# Patient Record
Sex: MALE | Race: WHITE
[De-identification: names, ages, dates, MRNs, and addresses within clinical notes are randomized per-mention and may not be internally consistent; named-entity substitution may affect disease eponyms.]

---

## 2018-06-10 ENCOUNTER — HOSPITAL ENCOUNTER (EMERGENCY)
Dept: HOSPITAL 56 - MW.ED | Age: 28
Discharge: OTHER INSTITUTION | End: 2018-06-10
Payer: SELF-PAY

## 2018-06-10 VITALS — SYSTOLIC BLOOD PRESSURE: 139 MMHG | DIASTOLIC BLOOD PRESSURE: 83 MMHG

## 2018-06-10 DIAGNOSIS — T39.012A: ICD-10-CM

## 2018-06-10 DIAGNOSIS — R51: ICD-10-CM

## 2018-06-10 DIAGNOSIS — T39.1X2A: Primary | ICD-10-CM

## 2018-06-10 LAB
APAP SERPL-MCNC: 21.8 UG/ML
CHLORIDE SERPL-SCNC: 103 MMOL/L (ref 98–107)
SODIUM SERPL-SCNC: 141 MMOL/L (ref 136–148)

## 2018-06-10 PROCEDURE — 85025 COMPLETE CBC W/AUTO DIFF WBC: CPT

## 2018-06-10 PROCEDURE — 81001 URINALYSIS AUTO W/SCOPE: CPT

## 2018-06-10 PROCEDURE — 36415 COLL VENOUS BLD VENIPUNCTURE: CPT

## 2018-06-10 PROCEDURE — 85610 PROTHROMBIN TIME: CPT

## 2018-06-10 PROCEDURE — 80053 COMPREHEN METABOLIC PANEL: CPT

## 2018-06-10 PROCEDURE — 84443 ASSAY THYROID STIM HORMONE: CPT

## 2018-06-10 PROCEDURE — 83735 ASSAY OF MAGNESIUM: CPT

## 2018-06-10 PROCEDURE — 93005 ELECTROCARDIOGRAM TRACING: CPT

## 2018-06-10 PROCEDURE — 82375 ASSAY CARBOXYHB QUANT: CPT

## 2018-06-10 PROCEDURE — 80305 DRUG TEST PRSMV DIR OPT OBS: CPT

## 2018-06-10 PROCEDURE — 99285 EMERGENCY DEPT VISIT HI MDM: CPT

## 2018-06-10 PROCEDURE — G0480 DRUG TEST DEF 1-7 CLASSES: HCPCS

## 2018-06-10 PROCEDURE — 96374 THER/PROPH/DIAG INJ IV PUSH: CPT

## 2018-06-10 NOTE — EDM.PDOC
ED HPI GENERAL MEDICAL PROBLEM





- General


Chief Complaint: Behavioral/Psych


Stated Complaint: MEDICATION OD


Time Seen by Provider: 06/10/18 07:16


Source of Information: Reports: Patient





- History of Present Illness


INITIAL COMMENTS - FREE TEXT/NARRATIVE: 





HISTORY AND PHYSICAL:


History of present illness:


[Patient presents with suicide attempt last night





He had locked himself in the garage between 11 PM and 1 AM with a vehicle 

running, 9 hours later carboxyhemoglobin level is 13, at approximately 1 AM he 

took a mix of aspirin and Tylenol approximately 30 tablets levels are nontoxic 

at this time 9 hours later





Patient is in no distress, denies history of depression or chronic medications 

illness or disease denies smoking alcohol or illicit drug use





No prior suicidal ideation or attempt





No fever nausea vomiting chills sweats no chest pain shortness breath headache 

dizziness palpitation no bowel or urine symptoms]


Review of systems: 


As per history of present illness and below otherwise all systems reviewed and 

negative.


Past medical history: 


As per history of present illness and as reviewed below otherwise 

noncontributory.


Surgical history: 


As per history of present illness and as reviewed below otherwise 

noncontributory.


Social history: 


No reported history of drug or alcohol abuse.


Family history: 


As per history of present illness and as reviewed below otherwise 

noncontributory.


Physical exam:


HEENT: Atraumatic, normocephalic, pupils reactive, negative for conjunctival 

pallor or scleral icterus, mucous membranes moist, throat clear, neck supple, 

nontender, trachea midline.


Lungs: Clear to auscultation, breath sounds equal bilaterally, chest nontender.


Heart: S1S2, regular, negative for clicks, rubs, or JVD.


Abdomen: Soft, nondistended, nontender. Negative for masses or 

hepatosplenomegaly. Negative for costovertebral tenderness.


Pelvis: Stable nontender.


Genitourinary: Deferred.


Rectal: Deferred.


Extremities: Atraumatic, negative for cords or calf pain. Neurovascular 

unremarkable.


Neuro: Awake, alert, oriented. Cranial nerves II through XII unremarkable. 

Cerebellum unremarkable. Motor and sensory unremarkable throughout. Exam 

nonfocal.


Diagnostics:


[CBC CMP UA drug screen aspirin Tylenol and alcohol levels


EKG


]


Therapeutics:


[Oxygen]


Impression: 


[Suicide attempt]


Definitive disposition and diagnosis as appropriate pending reevaluation and 

review of above.


  ** Headache


Pain Score (Numeric/FACES): 7





- Related Data


 Allergies











Allergy/AdvReac Type Severity Reaction Status Date / Time


 


No Known Allergies Allergy   Verified 06/10/18 07:09











Home Meds: 


 Home Meds





. [No Known Home Meds]  02/12/16 [History]











Past Medical History





- Past Health History


Medical/Surgical History: Denies Medical/Surgical History





Social & Family History





- Family History


Family Medical History: Noncontributory





ED ROS GENERAL





- Review of Systems


Review Of Systems: See Below





ED EXAM, GENERAL





- Physical Exam


Exam: See Below





Course





- Vital Signs


Last Recorded V/S: 


 Last Vital Signs











Temp  98.6 F   06/10/18 07:06


 


Pulse  99   06/10/18 07:06


 


Resp  18   06/10/18 07:06


 


BP  139/83   06/10/18 07:06


 


Pulse Ox  96   06/10/18 07:06














- Orders/Labs/Meds


Orders: 


 Active Orders 24 hr











 Category Date Time Status


 


 EKG Documentation Completion [RC] STAT Care  06/10/18 07:05 Active


 


 DRUG SCREEN, URINE [URCHEM] Stat Lab  06/10/18 07:05 Ordered


 


 INR,PT,PROTHROMBIN TIME [COAG] Stat Lab  06/10/18 08:29 Ordered


 


 UA W/MICROSCOPIC [URIN] Stat Lab  06/10/18 07:05 Ordered











Labs: 


 Laboratory Tests











  06/10/18 06/10/18 06/10/18 Range/Units





  07:16 07:16 07:16 


 


WBC  12.43 H    (4.0-11.0)  K/uL


 


RBC  5.45    (4.50-5.90)  M/uL


 


Hgb  16.8    (13.0-17.0)  g/dL


 


Hct  45.5    (38.0-50.0)  %


 


MCV  83.5    (80.0-98.0)  fL


 


MCH  30.8    (27.0-32.0)  pg


 


MCHC  36.9    (31.0-37.0)  g/dL


 


RDW Std Deviation  39.4    (28.0-62.0)  fl


 


RDW Coeff of Spencer  13    (11.0-15.0)  %


 


Plt Count  252    (150-400)  K/uL


 


MPV  10.40    (7.40-12.00)  fL


 


Neut % (Auto)  82.5 H    (48.0-80.0)  %


 


Lymph % (Auto)  11.1 L    (16.0-40.0)  %


 


Mono % (Auto)  6.2    (0.0-15.0)  %


 


Eos % (Auto)  0.0    (0.0-7.0)  %


 


Baso % (Auto)  0.2    (0.0-1.5)  %


 


Neut # (Auto)  10.3 H    (1.4-5.7)  K/uL


 


Lymph # (Auto)  1.4    (0.6-2.4)  K/uL


 


Mono # (Auto)  0.8    (0.0-0.8)  K/uL


 


Eos # (Auto)  0.0    (0.0-0.7)  K/uL


 


Baso # (Auto)  0.0    (0.0-0.1)  K/uL


 


Nucleated RBC %  0.0    /100WBC


 


Nucleated RBCs #  0    K/uL


 


ABG Carboxyhemoglobin   13.1   (0-15)  %


 


Sodium    141  (136-148)  mmol/L


 


Potassium    3.7  (3.5-5.1)  mmol/L


 


Chloride    103  ()  mmol/L


 


Carbon Dioxide    22.5  (21.0-32.0)  mmol/L


 


BUN    13  (7.0-18.0)  mg/dL


 


Creatinine    1.4 H  (0.8-1.3)  mg/dL


 


Est Cr Clr Drug Dosing    81.84  mL/min


 


Estimated GFR (MDRD)    > 60.0  ml/min


 


Glucose    147 H  ()  mg/dL


 


Calcium    9.4  (8.5-10.1)  mg/dL


 


Magnesium    1.9  (1.8-2.4)  mg/dL


 


Total Bilirubin    0.4  (0.2-1.0)  mg/dL


 


AST    22  (15-37)  IU/L


 


ALT    24  (14-63)  IU/L


 


Alkaline Phosphatase    65  ()  U/L


 


Total Protein    8.2  (6.4-8.2)  g/dL


 


Albumin    4.5  (3.4-5.0)  g/dL


 


Globulin    3.7 H  (2.0-3.5)  g/dL


 


Albumin/Globulin Ratio    1.2 L  (1.3-2.8)  


 


TSH 3rd Generation    1.41  (0.36-3.74)  uIU/mL


 


Salicylates    24.9 H  (0-20)  mg/dL


 


Acetaminophen    21.8  ug/mL


 


Ethyl Alcohol    < 3.0  mg/dL











Meds: 


Medications














Discontinued Medications














Generic Name Dose Route Start Last Admin





  Trade Name Ameena  PRN Reason Stop Dose Admin


 


Ondansetron HCl  8 mg  06/10/18 07:12  06/10/18 07:21





  Zofran  IVPUSH  06/10/18 07:13  8 mg





  ONETIME ONE   Administration





     





     





     





     














Departure





- Departure


Time of Disposition: 08:45


Disposition: DC/Tfer to Other 70


Condition: Fair


Clinical Impression: 


 Suicide attempt








- Discharge Information


Referrals: 


Dylan Alexander MD [Primary Care Provider] - 


Forms:  ED Department Discharge





- My Orders


Last 24 Hours: 


My Active Orders





06/10/18 08:29


INR,PT,PROTHROMBIN TIME [COAG] Stat 














- Assessment/Plan


Last 24 Hours: 


My Active Orders





06/10/18 08:29


INR,PT,PROTHROMBIN TIME [COAG] Stat

## 2019-08-17 ENCOUNTER — HOSPITAL ENCOUNTER (EMERGENCY)
Dept: HOSPITAL 56 - MW.ED | Age: 29
Discharge: NURSING, REHAB OR LONG-TERM CARE | End: 2019-08-17
Payer: SELF-PAY

## 2019-08-17 VITALS — DIASTOLIC BLOOD PRESSURE: 85 MMHG | SYSTOLIC BLOOD PRESSURE: 143 MMHG

## 2019-08-17 DIAGNOSIS — T43.212A: ICD-10-CM

## 2019-08-17 DIAGNOSIS — Z79.899: ICD-10-CM

## 2019-08-17 DIAGNOSIS — T40.4X2A: Primary | ICD-10-CM

## 2019-08-17 DIAGNOSIS — F32.9: ICD-10-CM

## 2019-08-17 LAB
APAP SERPL-MCNC: <2 UG/ML
CHLORIDE SERPL-SCNC: 108 MMOL/L (ref 98–107)
SODIUM SERPL-SCNC: 143 MMOL/L (ref 136–148)

## 2019-08-17 PROCEDURE — G0480 DRUG TEST DEF 1-7 CLASSES: HCPCS

## 2019-08-17 PROCEDURE — 80305 DRUG TEST PRSMV DIR OPT OBS: CPT

## 2019-08-17 PROCEDURE — 80053 COMPREHEN METABOLIC PANEL: CPT

## 2019-08-17 PROCEDURE — 36415 COLL VENOUS BLD VENIPUNCTURE: CPT

## 2019-08-17 PROCEDURE — 99285 EMERGENCY DEPT VISIT HI MDM: CPT

## 2019-08-17 PROCEDURE — 81003 URINALYSIS AUTO W/O SCOPE: CPT

## 2019-08-17 PROCEDURE — 93005 ELECTROCARDIOGRAM TRACING: CPT

## 2019-08-17 PROCEDURE — 96374 THER/PROPH/DIAG INJ IV PUSH: CPT

## 2019-08-17 PROCEDURE — 85025 COMPLETE CBC W/AUTO DIFF WBC: CPT

## 2019-08-17 PROCEDURE — 96361 HYDRATE IV INFUSION ADD-ON: CPT

## 2019-08-17 PROCEDURE — 84443 ASSAY THYROID STIM HORMONE: CPT

## 2019-08-17 PROCEDURE — 71045 X-RAY EXAM CHEST 1 VIEW: CPT

## 2019-08-17 NOTE — EDM.PDOC
ED HPI GENERAL MEDICAL PROBLEM





- General


Chief Complaint: Drug or Alcohol Abuse


Stated Complaint: EMS ARRIVAL


Time Seen by Provider: 08/17/19 15:30





- History of Present Illness


INITIAL COMMENTS - FREE TEXT/NARRATIVE: 


HISTORY AND PHYSICAL:





History of present illness:


Patient 29-year-old male history of depression presents status post suicide 

attempt in the form of overdose he took an unknown amount of tramadol Cymbalta 

and used alcohol with this he had represented this to paramedics who arrived 

after they were called by the patient's roommate for this event.





Review of systems: 


As per history of present illness and below otherwise all systems reviewed and 

negative.





Past medical history: 


As per history of present illness and as reviewed below otherwise 

noncontributory.





Surgical history: 


As per history of present illness and as reviewed below otherwise 

noncontributory.





Social history: 


No reported history of drug or alcohol abuse.





Family history: 


As per history of present illness and as reviewed below otherwise 

noncontributory.





Physical exam:


HEENT: Atraumatic, normocephalic, pupils reactive, negative for conjunctival 

pallor or scleral icterus, mucous membranes moist, throat clear, neck supple, 

nontender, trachea midline.


Lungs: Clear to auscultation, breath sounds equal bilaterally, chest nontender.


Heart: S1S2, regular, negative for clicks, rubs, or JVD.


Abdomen: Soft, nondistended, nontender. Negative for masses or 

hepatosplenomegaly. Negative for costovertebral tenderness.


Pelvis: Stable nontender.


Genitourinary: Deferred.


Rectal: Deferred.


Extremities: Atraumatic, negative for cords or calf pain. Neurovascular 

unremarkable.


Neuro: Patient somnolent he does follow commands and move all extremities he is 

protecting his airway is limited grossly nonfocal exam





Diagnostics:


Psychiatric panel





Therapeutics:


IV O2 monitor





Impression: 


#1 depressive episode with suicide attempt #2 poly-overdose





Definitive disposition and diagnosis as appropriate pending reevaluation and 

review of above.








- Related Data


 Allergies











Allergy/AdvReac Type Severity Reaction Status Date / Time


 


No Known Allergies Allergy   Verified 08/17/19 15:36











Home Meds: 


 Home Meds





DULoxetine [Cymbalta] 30 mg PO DAILY 08/17/19 [History]











Past Medical History





- Past Health History


Medical/Surgical History: Denies Medical/Surgical History





Social & Family History





- Family History


Family Medical History: Noncontributory





- Caffeine Use


Caffeine Use: Reports: None





ED ROS GENERAL





- Review of Systems


Review Of Systems: ROS reveals no pertinent complaints other than HPI.





ED EXAM, GENERAL





- Physical Exam


Exam: See Below (See dictation)





Course





- Vital Signs


Last Recorded V/S: 


 Last Vital Signs











Temp  36.1 C   08/17/19 15:34


 


Pulse  91   08/17/19 15:34


 


Resp  20   08/17/19 15:34


 


BP  119/81   08/17/19 15:34


 


Pulse Ox  97   08/17/19 15:34














- Orders/Labs/Meds


Orders: 


 Active Orders 24 hr











 Category Date Time Status


 


 EKG Documentation Completion [RC] STAT Care  08/17/19 15:33 Active


 


 Chest 1V Frontal [CR] Stat Exams  08/17/19 15:33 Ordered


 


 Sodium Chloride 0.9% [Normal Saline] 1,000 ml Med  08/17/19 15:38 Active





 IV .Bolus   








 Medication Orders





Sodium Chloride (Normal Saline)  1,000 mls @ 999 mls/hr IV .Bolus ONE


   Stop: 08/17/19 16:38


   Last Admin: 08/17/19 16:05  Dose: 999 mls/hr








Labs: 


 Laboratory Tests











  08/17/19 08/17/19 08/17/19 Range/Units





  15:30 15:30 15:40 


 


WBC  5.47    (4.0-11.0)  K/uL


 


RBC  5.36    (4.50-5.90)  M/uL


 


Hgb  16.2    (13.0-17.0)  g/dL


 


Hct  45.8    (38.0-50.0)  %


 


MCV  85.4    (80.0-98.0)  fL


 


MCH  30.2    (27.0-32.0)  pg


 


MCHC  35.4    (31.0-37.0)  g/dL


 


RDW Std Deviation  40.4    (28.0-62.0)  fl


 


RDW Coeff of Spencer  13    (11.0-15.0)  %


 


Plt Count  225    (150-400)  K/uL


 


MPV  10.60    (7.40-12.00)  fL


 


Neut % (Auto)  51.8    (48.0-80.0)  %


 


Lymph % (Auto)  36.6    (16.0-40.0)  %


 


Mono % (Auto)  9.3    (0.0-15.0)  %


 


Eos % (Auto)  1.6    (0.0-7.0)  %


 


Baso % (Auto)  0.7    (0.0-1.5)  %


 


Neut # (Auto)  2.8    (1.4-5.7)  K/uL


 


Lymph # (Auto)  2.0    (0.6-2.4)  K/uL


 


Mono # (Auto)  0.5    (0.0-0.8)  K/uL


 


Eos # (Auto)  0.1    (0.0-0.7)  K/uL


 


Baso # (Auto)  0.0    (0.0-0.1)  K/uL


 


Nucleated RBC %  0.0    /100WBC


 


Nucleated RBCs #  0    K/uL


 


Sodium   143   (136-148)  mmol/L


 


Potassium   3.8   (3.5-5.1)  mmol/L


 


Chloride   108 H   ()  mmol/L


 


Carbon Dioxide   25.4   (21.0-32.0)  mmol/L


 


BUN   9   (7.0-18.0)  mg/dL


 


Creatinine   1.1   (0.8-1.3)  mg/dL


 


Est Cr Clr Drug Dosing   108.76   mL/min


 


Estimated GFR (MDRD)   > 60.0   ml/min


 


Glucose   93   ()  mg/dL


 


Calcium   8.7   (8.5-10.1)  mg/dL


 


Total Bilirubin   0.8   (0.2-1.0)  mg/dL


 


AST   22   (15-37)  IU/L


 


ALT   32   (14-63)  IU/L


 


Alkaline Phosphatase   63   ()  U/L


 


Total Protein   7.3   (6.4-8.2)  g/dL


 


Albumin   3.7   (3.4-5.0)  g/dL


 


Globulin   3.6   (2.6-4.0)  g/dL


 


Albumin/Globulin Ratio   1.0   (0.9-1.6)  


 


TSH 3rd Generation   0.81   (0.36-3.74)  uIU/mL


 


Urine Color    YELLOW  


 


Urine Appearance    CLEAR  


 


Urine pH    6.0  (5.0-8.0)  


 


Ur Specific Gravity    1.015  (1.001-1.035)  


 


Urine Protein    NEGATIVE  (NEGATIVE)  mg/dL


 


Urine Glucose (UA)    NEGATIVE  (NEGATIVE)  mg/dL


 


Urine Ketones    NEGATIVE  (NEGATIVE)  mg/dL


 


Urine Occult Blood    NEGATIVE  (NEGATIVE)  


 


Urine Nitrite    NEGATIVE  (NEGATIVE)  


 


Urine Bilirubin    NEGATIVE  (NEGATIVE)  


 


Urine Urobilinogen    0.2  (<2.0)  EU/dL


 


Ur Leukocyte Esterase    NEGATIVE  (NEGATIVE)  


 


Salicylates   <0.2   (0-20)  mg/dL


 


Urine Opiates Screen     (NEGATIVE)  


 


Ur Oxycodone Screen     (NEGATIVE)  


 


Urine Methadone Screen     (NEGATIVE)  


 


Acetaminophen   <2.0   ug/mL


 


Ur Barbiturates Screen     (NEGATIVE)  


 


Ur Phencyclidine Scrn     (NEGATIVE)  


 


Ur Amphetamine Screen     (NEGATIVE)  


 


U Methamphetamines Scrn     (NEGATIVE)  


 


U Benzodiazepines Scrn     (NEGATIVE)  


 


U Cocaine Metab Screen     (NEGATIVE)  


 


U Marijuana (THC) Screen     (NEGATIVE)  


 


Ethyl Alcohol   3   mg/dL














  08/17/19 Range/Units





  15:40 


 


WBC   (4.0-11.0)  K/uL


 


RBC   (4.50-5.90)  M/uL


 


Hgb   (13.0-17.0)  g/dL


 


Hct   (38.0-50.0)  %


 


MCV   (80.0-98.0)  fL


 


MCH   (27.0-32.0)  pg


 


MCHC   (31.0-37.0)  g/dL


 


RDW Std Deviation   (28.0-62.0)  fl


 


RDW Coeff of Spencer   (11.0-15.0)  %


 


Plt Count   (150-400)  K/uL


 


MPV   (7.40-12.00)  fL


 


Neut % (Auto)   (48.0-80.0)  %


 


Lymph % (Auto)   (16.0-40.0)  %


 


Mono % (Auto)   (0.0-15.0)  %


 


Eos % (Auto)   (0.0-7.0)  %


 


Baso % (Auto)   (0.0-1.5)  %


 


Neut # (Auto)   (1.4-5.7)  K/uL


 


Lymph # (Auto)   (0.6-2.4)  K/uL


 


Mono # (Auto)   (0.0-0.8)  K/uL


 


Eos # (Auto)   (0.0-0.7)  K/uL


 


Baso # (Auto)   (0.0-0.1)  K/uL


 


Nucleated RBC %   /100WBC


 


Nucleated RBCs #   K/uL


 


Sodium   (136-148)  mmol/L


 


Potassium   (3.5-5.1)  mmol/L


 


Chloride   ()  mmol/L


 


Carbon Dioxide   (21.0-32.0)  mmol/L


 


BUN   (7.0-18.0)  mg/dL


 


Creatinine   (0.8-1.3)  mg/dL


 


Est Cr Clr Drug Dosing   mL/min


 


Estimated GFR (MDRD)   ml/min


 


Glucose   ()  mg/dL


 


Calcium   (8.5-10.1)  mg/dL


 


Total Bilirubin   (0.2-1.0)  mg/dL


 


AST   (15-37)  IU/L


 


ALT   (14-63)  IU/L


 


Alkaline Phosphatase   ()  U/L


 


Total Protein   (6.4-8.2)  g/dL


 


Albumin   (3.4-5.0)  g/dL


 


Globulin   (2.6-4.0)  g/dL


 


Albumin/Globulin Ratio   (0.9-1.6)  


 


TSH 3rd Generation   (0.36-3.74)  uIU/mL


 


Urine Color   


 


Urine Appearance   


 


Urine pH   (5.0-8.0)  


 


Ur Specific Gravity   (1.001-1.035)  


 


Urine Protein   (NEGATIVE)  mg/dL


 


Urine Glucose (UA)   (NEGATIVE)  mg/dL


 


Urine Ketones   (NEGATIVE)  mg/dL


 


Urine Occult Blood   (NEGATIVE)  


 


Urine Nitrite   (NEGATIVE)  


 


Urine Bilirubin   (NEGATIVE)  


 


Urine Urobilinogen   (<2.0)  EU/dL


 


Ur Leukocyte Esterase   (NEGATIVE)  


 


Salicylates   (0-20)  mg/dL


 


Urine Opiates Screen  NEGATIVE  (NEGATIVE)  


 


Ur Oxycodone Screen  NEGATIVE  (NEGATIVE)  


 


Urine Methadone Screen  NEGATIVE  (NEGATIVE)  


 


Acetaminophen   ug/mL


 


Ur Barbiturates Screen  NEGATIVE  (NEGATIVE)  


 


Ur Phencyclidine Scrn  NEGATIVE  (NEGATIVE)  


 


Ur Amphetamine Screen  NEGATIVE  (NEGATIVE)  


 


U Methamphetamines Scrn  NEGATIVE  (NEGATIVE)  


 


U Benzodiazepines Scrn  NEGATIVE  (NEGATIVE)  


 


U Cocaine Metab Screen  NEGATIVE  (NEGATIVE)  


 


U Marijuana (THC) Screen  NEGATIVE  (NEGATIVE)  


 


Ethyl Alcohol   mg/dL











Meds: 


Medications











Generic Name Dose Route Start Last Admin





  Trade Name Freq  PRN Reason Stop Dose Admin


 


Sodium Chloride  1,000 mls @ 999 mls/hr  08/17/19 15:38  08/17/19 16:05





  Normal Saline  IV  08/17/19 16:38  999 mls/hr





  .Bolus ONE   Administration





     





     





     





     














Discontinued Medications














Generic Name Dose Route Start Last Admin





  Trade Name Freq  PRN Reason Stop Dose Admin


 


Ondansetron HCl  Confirm  08/17/19 15:35  08/17/19 15:55





  Zofran  Administered  08/17/19 15:36  Not Given





  Dose   





  4 mg   





  .ROUTE   





  .STK-MED ONE   





     





     





     





     


 


Ondansetron HCl  4 mg  08/17/19 15:38  08/17/19 16:05





  Zofran  IVPUSH  08/17/19 15:39  4 mg





  ONETIME ONE   Administration





     





     





     





     














Departure





- Departure


Time of Disposition: 16:17


Disposition: DC[facility] to [facility]


Condition: Good


Clinical Impression: 


 Overdose, Suicide attempt








- Discharge Information


Referrals: 


PCP,Unknown [Primary Care Provider] - 


Forms:  ED Department Discharge





- My Orders


Last 24 Hours: 


My Active Orders





08/17/19 15:33


EKG Documentation Completion [RC] STAT 


Chest 1V Frontal [CR] Stat 





08/17/19 15:38


Sodium Chloride 0.9% [Normal Saline] 1,000 ml IV .Bolus 














- Assessment/Plan


Last 24 Hours: 


My Active Orders





08/17/19 15:33


EKG Documentation Completion [RC] STAT 


Chest 1V Frontal [CR] Stat 





08/17/19 15:38


Sodium Chloride 0.9% [Normal Saline] 1,000 ml IV .Bolus

## 2019-08-17 NOTE — CR
Indication:



Overdose.



Technique:



A single AP portable view of the chest was obtained.



Comparison:



None



Findings:



Heart is normal in size. The lungs are clear. No infiltrate, pleural 

effusion, or pneumothorax is identified.



Impression:



No acute cardiopulmonary process.



Dictated by Rupal De MD @ Aug 17 2019  4:31PM



Signed by Dr. Rupal De @ Aug 17 2019  4:32PM

## 2019-09-07 ENCOUNTER — HOSPITAL ENCOUNTER (EMERGENCY)
Dept: HOSPITAL 56 - MW.ED | Age: 29
Discharge: HOME | End: 2019-09-07
Payer: COMMERCIAL

## 2019-09-07 VITALS — SYSTOLIC BLOOD PRESSURE: 132 MMHG | DIASTOLIC BLOOD PRESSURE: 76 MMHG

## 2019-09-07 DIAGNOSIS — Z79.899: ICD-10-CM

## 2019-09-07 DIAGNOSIS — R07.81: Primary | ICD-10-CM
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
## 2020-10-23 ENCOUNTER — HOSPITAL ENCOUNTER (EMERGENCY)
Dept: HOSPITAL 56 - MW.ED | Age: 30
Discharge: OTHER INSTITUTION | End: 2020-10-23
Payer: COMMERCIAL

## 2020-10-23 VITALS — DIASTOLIC BLOOD PRESSURE: 74 MMHG | SYSTOLIC BLOOD PRESSURE: 117 MMHG | HEART RATE: 73 BPM

## 2020-10-23 DIAGNOSIS — Z20.828: ICD-10-CM

## 2020-10-23 DIAGNOSIS — T43.012A: Primary | ICD-10-CM

## 2020-10-23 DIAGNOSIS — F41.9: ICD-10-CM

## 2020-10-23 DIAGNOSIS — Z79.899: ICD-10-CM

## 2020-10-23 DIAGNOSIS — T43.592A: ICD-10-CM

## 2020-10-23 LAB
APAP SERPL-MCNC: <2 UG/ML
BUN SERPL-MCNC: 12 MG/DL (ref 7–18)
CHLORIDE SERPL-SCNC: 103 MMOL/L (ref 98–107)
CO2 SERPL-SCNC: 24.6 MMOL/L (ref 21–32)
GLUCOSE SERPL-MCNC: 167 MG/DL (ref 74–106)
POTASSIUM SERPL-SCNC: 3.9 MMOL/L (ref 3.5–5.1)
SODIUM SERPL-SCNC: 140 MMOL/L (ref 136–148)

## 2020-10-23 PROCEDURE — 96374 THER/PROPH/DIAG INJ IV PUSH: CPT

## 2020-10-23 PROCEDURE — 87635 SARS-COV-2 COVID-19 AMP PRB: CPT

## 2020-10-23 PROCEDURE — 51702 INSERT TEMP BLADDER CATH: CPT

## 2020-10-23 PROCEDURE — 74018 RADEX ABDOMEN 1 VIEW: CPT

## 2020-10-23 PROCEDURE — 36415 COLL VENOUS BLD VENIPUNCTURE: CPT

## 2020-10-23 PROCEDURE — 80307 DRUG TEST PRSMV CHEM ANLYZR: CPT

## 2020-10-23 PROCEDURE — 84443 ASSAY THYROID STIM HORMONE: CPT

## 2020-10-23 PROCEDURE — 71045 X-RAY EXAM CHEST 1 VIEW: CPT

## 2020-10-23 PROCEDURE — 99292 CRITICAL CARE ADDL 30 MIN: CPT

## 2020-10-23 PROCEDURE — 80053 COMPREHEN METABOLIC PANEL: CPT

## 2020-10-23 PROCEDURE — U0002 COVID-19 LAB TEST NON-CDC: HCPCS

## 2020-10-23 PROCEDURE — 83735 ASSAY OF MAGNESIUM: CPT

## 2020-10-23 PROCEDURE — 93005 ELECTROCARDIOGRAM TRACING: CPT

## 2020-10-23 PROCEDURE — 99291 CRITICAL CARE FIRST HOUR: CPT

## 2020-10-23 PROCEDURE — 96376 TX/PRO/DX INJ SAME DRUG ADON: CPT

## 2020-10-23 PROCEDURE — 85025 COMPLETE CBC W/AUTO DIFF WBC: CPT

## 2020-10-23 PROCEDURE — 43752 NASAL/OROGASTRIC W/TUBE PLMT: CPT

## 2020-10-23 PROCEDURE — 80305 DRUG TEST PRSMV DIR OPT OBS: CPT

## 2020-10-23 PROCEDURE — 31500 INSERT EMERGENCY AIRWAY: CPT

## 2020-10-23 PROCEDURE — 81001 URINALYSIS AUTO W/SCOPE: CPT

## 2020-10-23 NOTE — PCM.PRNOTE
- Free Text/Narrative


Note: 





Anes Note





I was called to Er to assist with sedation on thispatient who is on a 

ventilator.





At 2155 50 mg rocuronium was administered IV.





At 2200 4 mg versed was administered IV.





Tolerated well.





VS stable.





Adilson Marion CRNA





Time with patient [id number]

## 2020-10-23 NOTE — CR
Indication:



Post intubation



Technique:



Portable supine AP view of the chest



Comparison:



None



Findings/Impression:



1. Endotracheal tube tip approximately 5.6 cm above the matthew. 



2. Suboptimal inspiration. The lungs are aerated. The cardiomediastinal 

silhouette is unremarkable. 



3. No sizable pleural effusion or pneumothorax, within limitations of 

noncontrast technique. 



4. The osseous structures are unremarkable. Defibrillator pad projects over 

the right chest.



Dictated by Fani Javier MD @ Oct 23 2020  9:48PM



Signed by Dr. Fani Javier @ Oct 23 2020  9:50PM

## 2020-10-23 NOTE — CR
Indication:



Gastric tube placement



Technique:



Portable supine AP view of the abdomen.



Comparison:



None



Findings/Impression:



1. Distal end of the gastric tube projects in the left upper quadrant, with 

tip at the expected location of the gastric antrum.



2. No abnormally distended bowel loops are demonstrated. 



3. The osseous structures are unremarkable. Defibrillator pad is noted on 

the left.



Dictated by Fani Javier MD @ Oct 23 2020  9:50PM



Signed by Dr. Fani Javier @ Oct 23 2020  9:52PM

## 2021-12-07 NOTE — EDM.PDOC
<Gume Jennings - Last Filed: 12/07/21 18:41>





ED HPI GENERAL MEDICAL PROBLEM





- General


Chief Complaint: General


Stated Complaint: INVOLUNTARY COMMITTAL


Time Seen by Provider: 12/07/21 16:44


Source of Information: Reports: Patient


History Limitations: Reports: No Limitations





- History of Present Illness


INITIAL COMMENTS - FREE TEXT/NARRATIVE: 





Patient is a 31-year-old male to female transgender who presents today on police

hold.  Patient made some canisters parents about wanting to harm another 

coworker.  He states that he would not initiate any aggression towards coworker 

but the coworker brings a knife to work and is afraid he may harm himself he 

brings his gun to work.  Patient has made comments in the past that he want to 

harm himself or harm others that would do well to him but does not have any 

suicidal homicidal ideations at the moment.





- Related Data


                                    Allergies











Allergy/AdvReac Type Severity Reaction Status Date / Time


 


No Known Allergies Allergy   Verified 12/07/21 16:57











Home Meds: 


                                    Home Meds





DULoxetine [Cymbalta] 30 mg PO DAILY 08/17/19 [History]


Estradiol Valerate 1 dose PO DAILY 12/07/21 [History]


Progesterone, Micronized [Progesterone] 1 dose PO DAILY 12/07/21 [History]











Past Medical History





- Past Health History


Medical/Surgical History: Denies Medical/Surgical History





Social & Family History





- Family History


Family Medical History: No Pertinent Family History





- Caffeine Use


Caffeine Use: Reports: None





ED ROS GENERAL





- Review of Systems


Review Of Systems: See Below


Constitutional: Reports: No Symptoms


HEENT: Reports: No Symptoms


Respiratory: Reports: No Symptoms


Cardiovascular: Reports: No Symptoms


Endocrine: Reports: No Symptoms


GI/Abdominal: Reports: No Symptoms


: Reports: No Symptoms


Musculoskeletal: Reports: No Symptoms


Skin: Reports: No Symptoms


Neurological: Reports: No Symptoms


Psychiatric: Reports: Homicidal Ideation, Suicidal Ideation


Hematologic/Lymphatic: Reports: No Symptoms


Immunologic: Reports: No Symptoms





ED EXAM, GENERAL





- Physical Exam


Exam: See Below


Exam Limited By: No Limitations


General Appearance: Alert, WD/WN, No Apparent Distress


Eye Exam: Bilateral Eye: EOMI


Throat/Mouth: Normal Inspection


Respiratory/Chest: No Respiratory Distress, Lungs Clear, Normal Breath Sounds


Cardiovascular: Normal Peripheral Pulses, Regular Rate, Rhythm


GI/Abdominal: Normal Bowel Sounds


Extremities: Normal Inspection, Normal Range of Motion


Neurological: Alert, Oriented, Normal Cognition, Normal Gait


Psychiatric: Normal Affect, Normal Mood





Course





- Re-Assessments/Exams


Free Text/Narrative Re-Assessment/Exam: 





12/07/21 18:41


We are still waiting for urine in the interim we did call [facility] and they did not have any psych beds available [facility] 

does have a bed available but they need a urine toxicology screen which were 

still waiting for.  Once back will call for transfer.





Departure





- Departure


Time of Disposition: 18:41


Disposition: DC/Tfer to Psych Hosp[facility]


Condition: Good


Clinical Impression: 


 Homicidal ideation








- Discharge Information


Instructions:  Managing Bipolar Disorder


Referrals: 


PCP,None [Primary Care Provider] - 


Forms:  ED Department Discharge


Additional Instructions: 


Your seen and evaluated in the ER today secondary to concerns about 

homicidal/suicidal ideation.  You are currently  ordered court hold for 

mental health evaluation.  Unfortunately we are unable to accommodate the court 

hold within the state of [place] at this time given that there is no 

availability for any mental health beds and [facility], [facility], [facility], [facility].  Please continue to monitor the 

patient and return to the ER tomorrow so that we can try once again disease with

any availability and any of the hospitals.





The following information is given to patients seen in the emergency department 

who are being discharged to home. This information is to outline your options 

for follow-up care. We provide all patients seen in our emergency department 

with a follow-up referral.





The need for follow-up, as well as the timing and circumstances, are variable 

depending upon the specifics of your emergency department visit.





If you don't have a primary care physician on staff, we will provide you with a 

referral. We always advise you to contact your personal physician following an 

emergency department visit to inform them of the circumstance of the visit and 

for follow-up with them and/or the need for any referrals to a consulting 

specialist.





The emergency department will also refer you to a specialist when appropriate. 

This referral assures that you have the opportunity for follow-up care with a 

specialist. All of these measure are taken in an effort to provide you with 

optimal care, which includes your follow-up.





Under all circumstances we always encourage you to contact your private 

physician who remains a resource for coordinating your care. When calling for 

follow-up care, please make the office aware that this follow-up is from your 

recent emergency room visit. If for any reason you are refused follow-up, please

contact the [facility] Emergency Department

at [phone] and asked to speak to the emergency department charge nurse.





[facility]


[address] [place] [address]


Phone: [phone]


Fax: [phone]








[facility]


[address] [place] [address]


Phone: [phone]


Fax: [phone]








Sepsis Event Note (ED)





- Evaluation


Sepsis Screening Result: No Definite Risk





- Assessment/Plan


Plan: 





Patient is a 31-year-old male to female brought into police custody for 

evaluation at the made comments to his parents about wanting to harm a coworker.

 Patient currently denies any suicide ideations and states that he would not 

harm anyone stay try to harm first.  Will medically clear and have patient 

evaluated.





<Murray Leblanc - Last Filed: 12/07/21 20:20>





ED HPI GENERAL MEDICAL PROBLEM





- History of Present Illness


INITIAL COMMENTS - FREE TEXT/NARRATIVE: 


8:11 PM: Signout received at 7 PM from Dr. Jennings pending drug screen.  There has

been no health beds available at [facility], [facility], [facility], [facility].  At this time, patient is still a 

court hold with s custody.  I have discussed the case with the 's

and they will not be able to transfer patient outside the state given that he is

a court hold.  Patient will need to be held in the [facility] overnight and they will 

return tomorrow morning to start the process of transfer once again to a 

appropriate mental health facility that can accommodate her.  After discussion 

with the 's deputy, they will take the patient back to the long-term 

facility for safety and they will return tomorrow to try again to find a 

facility that appropriate.





Course





- Vital Signs


Last Recorded V/S: 





                                Last Vital Signs











Temp  97.4 F   12/07/21 16:59


 


Pulse  91   12/07/21 16:59


 


Resp  16   12/07/21 16:59


 


BP  147/110 H  12/07/21 16:59


 


Pulse Ox  96   12/07/21 16:59














- Orders/Labs/Meds


Labs: 





                                Laboratory Tests











  12/07/21 12/07/21 12/07/21 Range/Units





  15:25 15:25 17:47 


 


WBC  10.40    (4.0-11.0)  K/uL


 


RBC  4.70    (4.50-5.90)  M/uL


 


Hgb  14.2    (13.0-17.0)  g/dL


 


Hct  40.1    (38.0-50.0)  %


 


MCV  85.3    (80.0-98.0)  fL


 


MCH  30.2    (27.0-32.0)  pg


 


MCHC  35.4    (31.0-37.0)  g/dL


 


RDW Std Deviation  39.1    (28.0-62.0)  fl


 


RDW Coeff of Spencer  13    (11.0-15.0)  %


 


Plt Count  295    (150-400)  K/uL


 


MPV  10.60    (7.40-12.00)  fL


 


Neut % (Auto)  74.8    (48.0-80.0)  %


 


Lymph % (Auto)  18.0    (16.0-40.0)  %


 


Mono % (Auto)  6.4    (0.0-15.0)  %


 


Eos % (Auto)  0.6    (0.0-7.0)  %


 


Baso % (Auto)  0.2    (0.0-1.5)  %


 


Neut # (Auto)  7.8 H    (1.4-5.7)  K/uL


 


Lymph # (Auto)  1.9    (0.6-2.4)  K/uL


 


Mono # (Auto)  0.7    (0.0-0.8)  K/uL


 


Eos # (Auto)  0.1    (0.0-0.7)  K/uL


 


Baso # (Auto)  0.0    (0.0-0.1)  K/uL


 


Nucleated RBC %  0.0    /100WBC


 


Nucleated RBCs #  0    K/uL


 


Sodium   138   (136-148)  mmol/L


 


Potassium   4.0   (3.5-5.1)  mmol/L


 


Chloride   103   ()  mmol/L


 


Carbon Dioxide   25.1   (21.0-32.0)  mmol/L


 


BUN   12   (7.0-18.0)  mg/dL


 


Creatinine   1.0   (0.8-1.3)  mg/dL


 


Est Cr Clr Drug Dosing   110.51   mL/min


 


Estimated GFR (MDRD)   > 60.0   ml/min


 


Glucose   102   ()  mg/dL


 


Calcium   8.8   (8.5-10.1)  mg/dL


 


Total Bilirubin   0.3   (0.2-1.0)  mg/dL


 


AST   22   (15-37)  IU/L


 


ALT   24   (14-63)  IU/L


 


Alkaline Phosphatase   53   ()  U/L


 


Total Protein   7.8   (6.4-8.2)  g/dL


 


Albumin   3.6   (3.4-5.0)  g/dL


 


Globulin   4.2 H   (2.6-4.0)  g/dL


 


Albumin/Globulin Ratio   0.9   (0.9-1.6)  


 


Salicylates   0.8   (0-20)  mg/dL


 


Urine Opiates Screen     (NEGATIVE)  


 


Ur Oxycodone Screen     (NEGATIVE)  


 


Urine Methadone Screen     (NEGATIVE)  


 


Acetaminophen   <2.0   ug/mL


 


Ur Barbiturates Screen     (NEGATIVE)  


 


Ur Phencyclidine Scrn     (NEGATIVE)  


 


Ur Amphetamine Screen     (NEGATIVE)  


 


U Methamphetamines Scrn     (NEGATIVE)  


 


U Benzodiazepines Scrn     (NEGATIVE)  


 


U Cocaine Metab Screen     (NEGATIVE)  


 


U Marijuana (THC) Screen     (NEGATIVE)  


 


Ethyl Alcohol   <3   mg/dL


 


SARS-CoV-2 RNA (ZACHARY)    NEGATIVE  (NEGATIVE)  














  12/07/21 Range/Units





  18:50 


 


WBC   (4.0-11.0)  K/uL


 


RBC   (4.50-5.90)  M/uL


 


Hgb   (13.0-17.0)  g/dL


 


Hct   (38.0-50.0)  %


 


MCV   (80.0-98.0)  fL


 


MCH   (27.0-32.0)  pg


 


MCHC   (31.0-37.0)  g/dL


 


RDW Std Deviation   (28.0-62.0)  fl


 


RDW Coeff of Spencer   (11.0-15.0)  %


 


Plt Count   (150-400)  K/uL


 


MPV   (7.40-12.00)  fL


 


Neut % (Auto)   (48.0-80.0)  %


 


Lymph % (Auto)   (16.0-40.0)  %


 


Mono % (Auto)   (0.0-15.0)  %


 


Eos % (Auto)   (0.0-7.0)  %


 


Baso % (Auto)   (0.0-1.5)  %


 


Neut # (Auto)   (1.4-5.7)  K/uL


 


Lymph # (Auto)   (0.6-2.4)  K/uL


 


Mono # (Auto)   (0.0-0.8)  K/uL


 


Eos # (Auto)   (0.0-0.7)  K/uL


 


Baso # (Auto)   (0.0-0.1)  K/uL


 


Nucleated RBC %   /100WBC


 


Nucleated RBCs #   K/uL


 


Sodium   (136-148)  mmol/L


 


Potassium   (3.5-5.1)  mmol/L


 


Chloride   ()  mmol/L


 


Carbon Dioxide   (21.0-32.0)  mmol/L


 


BUN   (7.0-18.0)  mg/dL


 


Creatinine   (0.8-1.3)  mg/dL


 


Est Cr Clr Drug Dosing   mL/min


 


Estimated GFR (MDRD)   ml/min


 


Glucose   ()  mg/dL


 


Calcium   (8.5-10.1)  mg/dL


 


Total Bilirubin   (0.2-1.0)  mg/dL


 


AST   (15-37)  IU/L


 


ALT   (14-63)  IU/L


 


Alkaline Phosphatase   ()  U/L


 


Total Protein   (6.4-8.2)  g/dL


 


Albumin   (3.4-5.0)  g/dL


 


Globulin   (2.6-4.0)  g/dL


 


Albumin/Globulin Ratio   (0.9-1.6)  


 


Salicylates   (0-20)  mg/dL


 


Urine Opiates Screen  NEGATIVE  (NEGATIVE)  


 


Ur Oxycodone Screen  NEGATIVE  (NEGATIVE)  


 


Urine Methadone Screen  NEGATIVE  (NEGATIVE)  


 


Acetaminophen   ug/mL


 


Ur Barbiturates Screen  NEGATIVE  (NEGATIVE)  


 


Ur Phencyclidine Scrn  NEGATIVE  (NEGATIVE)  


 


Ur Amphetamine Screen  NEGATIVE  (NEGATIVE)  


 


U Methamphetamines Scrn  NEGATIVE  (NEGATIVE)  


 


U Benzodiazepines Scrn  NEGATIVE  (NEGATIVE)  


 


U Cocaine Metab Screen  NEGATIVE  (NEGATIVE)  


 


U Marijuana (THC) Screen  NEGATIVE  (NEGATIVE)  


 


Ethyl Alcohol   mg/dL


 


SARS-CoV-2 RNA (ZACHARY)   (NEGATIVE)  














Sepsis Event Note (ED)





- Focused Exam


Vital Signs: 





                                   Vital Signs











  Temp Pulse Resp BP Pulse Ox


 


 12/07/21 16:59  97.4 F  91  16  147/110 H  96

## 2024-04-05 NOTE — EDM.PDOC
ED HPI GENERAL MEDICAL PROBLEM





- General


Stated Complaint: OVERDOZE


Time Seen by Provider: 10/23/20 20:45


Source of Information: Reports: EMS


History Limitations: Reports: Altered Mental Status





- History of Present Illness


INITIAL COMMENTS - FREE TEXT/NARRATIVE: 





30-year-old male with history of anxiety, bipolar disorder, suicidal overdose 

was brought in by ambulance after an unwitnessed overdose.  EMS received a call 

from the [facility], [place] about 1 hour prior to arrival for overdosing on unknown quantity

of doxepin 50 mg, Abilify 15 mg, hydroxyzine 10 mg.  History and review of 

system is limited secondary to altered mental status.








Past medical history: No additional pertinent history


Past Surgical history: No additional pertinent history


Social history: No additional pertinent history


Family history: No additional pertinent history


________________________________________________________________________________





PHYSICAL EXAM





General: lethargic


HEENT: dry mucous membrane, pupils 2mm bilateral


Neck: supple, no meningismus, no Kernig or Brudzinski


Cardiac: S1S2 tachycardia


Respiratory: CTAB, bradypneic, no crackles or rales, no wheezing


Abdomen: Soft, nontender, no rebound or guarding, nondistended, no pulsatile 

mass.


Back: nontender


Musculoskeletal: NO tremors no clonus, no ridigity, NVI distally, no deformity


Neuro: lethargic











- Related Data


                                    Allergies











Allergy/AdvReac Type Severity Reaction Status Date / Time


 


No Known Allergies Allergy   Verified 10/23/20 21:03











Home Meds: 


                                    Home Meds





DULoxetine [Cymbalta] 30 mg PO DAILY 08/17/19 [History]











Past Medical History





- Past Health History


Medical/Surgical History: Denies Medical/Surgical History





Social & Family History





- Family History


Family Medical History: Noncontributory





- Caffeine Use


Caffeine Use: Reports: None





ED ROS GENERAL





- Review of Systems


Review Of Systems: Unable To Obtain


Reason Not Obtained: Altered mental status





ED EXAM, GENERAL





- Physical Exam


Exam: See Below (see dictation)





ED GENERAL MEDICAL PROCEDURES





- Endotracheal Intubation


Time of Intubation: 20:46


ET Intubation Indication: Respiratory Failure, Airway Protection


Preparation: Suction, Balloon Tested, BVM Set Up, Difficult Airway Equip


Airway Assessment: Obese


Pre-Oxygenation: Assisted with BVM


Anesthesia Meds: Etomidate, Succinylcholine


Placement: Orotracheal, Uncomplicated Placement


Cords Visualized: Yes, Grade 1


ETT Size In mm: 7.5


Number of Attempts: 1


Confirmed By: CO2 Indicator, Bilateral Breath Sounds, Chest Xray


Tube Secured By: By Provider


Endotracheal Intubation Comment: 





RSI / INTUBATION: 


Indication: Respiratory failure.  Oral airway was completed prior to intubation.

  The patient was placed in a flat position.  Continuous cardiac monitoring was 

performed.  Crash cart was in the room as well as respiratory therapist to help 

with airway management.  RSI was achieved using Etomidate 20mg and 

succinylcholine 120mg. The patient was easily ventilated using an ambu bag. A 

MAC 4 BLADE was used and inserted into the oropharynx at which time there was a 

Grade 1 view of the vocal cords. A 7.5-Mohawk ET was inserted and visualized 

going through the vocal cords. The stylette was removed. Colorimetric change was

 visualized on the CO2 meter. Breath sounds were heard in both lung fields 

equally.  Good chest rise with ventilation along with misting in the ET tube was

 seen.  The endotracheal tube was placed at 23 cm, measured at the lip.  

Portable chest x-ray was obtained to confirm tube placement.  There were no 

complications.  Oxygenation post intubation was noted to be 100%.  Time spent: 

10 minutes


  ** #1 Interpretation


EKG Interpretation Comments: 





Heart rate = 101 bpm, sinus tachycardia, ME 168ms, , QTc 501ms, no STEMI.

 EKG and rhythm strip interpreted by me at 2101





  ** #2 Interpretation


EKG Interpretation Comments: 





Heart rate = 96 bpm, normal sinus rhythm, , , QTc 539ms,  no STEMI.

 EKG and rhythm strip interpreted by me at 2138





Course





- Vital Signs


Last Recorded V/S: 


                                Last Vital Signs











Temp  97 F   10/23/20 20:43


 


Pulse      


 


Resp  6 L  10/23/20 20:43


 


BP  170/90 H  10/23/20 20:43


 


Pulse Ox  98   10/23/20 20:43














- Orders/Labs/Meds


Orders: 


                               Active Orders 24 hr











 Category Date Time Status


 


 EKG Documentation Completion [RC] STAT Care  10/23/20 20:51 Active


 


 Gastrointestinal Tube Mgmt [RC] ASDIRECTED Care  10/23/20 20:49 Active


 


 Gastrointestinal Tube Mgmt [RC] ASDIRECTED Care  10/23/20 20:50 Active


 


 Insert Urinary Catheter [OM.PC] Q24H Care  10/23/20 21:00 Ordered


 


 RASS Sedation Scale [RC] ASDIRECTED Care  10/23/20 21:21 Active


 


 RT Airway Intubation [RC] ASDIRECTED Care  10/23/20 20:49 Active


 


 Urinary Catheter Assessment [RC] ASDIRECTED Care  10/23/20 20:50 Active


 


 Midazolam [Versed 5 MG/ML] 50 mg Med  10/23/20 22:15 Active





 Sodium Chloride 0.9% [Normal Saline] 40 ml   





 IV TITRATE   


 


 propofoL [Diprivan 100 ML] 100 ml Med  10/23/20 21:30 Active





 IV TITRATE   


 


 Desired Level of Sedation (RASS) [AST] Click to Edit Oth  10/23/20 21:21 

Ordered


 


 Nasogastric Orogastric Tube Insertion [OM.PC] Stat Oth  10/23/20 20:49 Ordered








                                Medication Orders





Propofol (Diprivan 100 Ml)  100 mls @ 3 mls/hr IV TITRATE YARELY; Protocol


Midazolam HCl 50 mg/ Sodium (Chloride)  50 mls @ 0.5 mls/hr IV TITRATE YARELY; 

Protocol








Labs: 


                                Laboratory Tests











  10/23/20 10/23/20 10/23/20 Range/Units





  20:45 20:45 20:46 


 


WBC  12.97 H    (4.0-11.0)  K/uL


 


RBC  4.43 L    (4.50-5.90)  M/uL


 


Hgb  13.1    (13.0-17.0)  g/dL


 


Hct  38.3    (38.0-50.0)  %


 


MCV  86.5    (80.0-98.0)  fL


 


MCH  29.6    (27.0-32.0)  pg


 


MCHC  34.2    (31.0-37.0)  g/dL


 


RDW Std Deviation  40.3    (28.0-62.0)  fl


 


RDW Coeff of Spencer  13    (11.0-15.0)  %


 


Plt Count  225    (150-400)  K/uL


 


MPV  10.80    (7.40-12.00)  fL


 


Neut % (Auto)  82.1 H    (48.0-80.0)  %


 


Lymph % (Auto)  11.6 L    (16.0-40.0)  %


 


Mono % (Auto)  5.9    (0.0-15.0)  %


 


Eos % (Auto)  0.2    (0.0-7.0)  %


 


Baso % (Auto)  0.2    (0.0-1.5)  %


 


Neut # (Auto)  10.6 H    (1.4-5.7)  K/uL


 


Lymph # (Auto)  1.5    (0.6-2.4)  K/uL


 


Mono # (Auto)  0.8    (0.0-0.8)  K/uL


 


Eos # (Auto)  0.0    (0.0-0.7)  K/uL


 


Baso # (Auto)  0.0    (0.0-0.1)  K/uL


 


Nucleated RBC %  0.0    /100WBC


 


Nucleated RBCs #  0    K/uL


 


Sodium   140   (136-148)  mmol/L


 


Potassium   3.9   (3.5-5.1)  mmol/L


 


Chloride   103   ()  mmol/L


 


Carbon Dioxide   24.6   (21.0-32.0)  mmol/L


 


BUN   12   (7.0-18.0)  mg/dL


 


Creatinine   1.0   (0.8-1.3)  mg/dL


 


Est Cr Clr Drug Dosing   TNP   


 


Estimated GFR (MDRD)   > 60.0   ml/min


 


Glucose   167 H   ()  mg/dL


 


Calcium   9.2   (8.5-10.1)  mg/dL


 


Magnesium   1.6 L   (1.8-2.4)  mg/dL


 


Total Bilirubin   0.3   (0.2-1.0)  mg/dL


 


AST   24   (15-37)  IU/L


 


ALT   24   (14-63)  IU/L


 


Alkaline Phosphatase   44 L   ()  U/L


 


Total Protein   7.1   (6.4-8.2)  g/dL


 


Albumin   3.7   (3.4-5.0)  g/dL


 


Globulin   3.4   (2.6-4.0)  g/dL


 


Albumin/Globulin Ratio   1.1   (0.9-1.6)  


 


TSH 3rd Generation   1.39   (0.36-3.74)  uIU/mL


 


Urine Color     


 


Urine Appearance     


 


Urine pH     (5.0-8.0)  


 


Ur Specific Gravity     (1.001-1.035)  


 


Urine Protein     (NEGATIVE)  mg/dL


 


Urine Glucose (UA)     (NEGATIVE)  mg/dL


 


Urine Ketones     (NEGATIVE)  mg/dL


 


Urine Occult Blood     (NEGATIVE)  


 


Urine Nitrite     (NEGATIVE)  


 


Urine Bilirubin     (NEGATIVE)  


 


Urine Urobilinogen     (<2.0)  EU/dL


 


Ur Leukocyte Esterase     (NEGATIVE)  


 


Urine RBC     (0-2/HPF)  


 


Urine WBC     (0-5/HPF)  


 


Ur Epithelial Cells     (NONE-FEW)  


 


Urine Bacteria     (NEGATIVE)  


 


Salicylates   0.7   (0-20)  mg/dL


 


Urine Opiates Screen     (NEGATIVE)  


 


Ur Oxycodone Screen     (NEGATIVE)  


 


Urine Methadone Screen     (NEGATIVE)  


 


Acetaminophen   <2.0   ug/mL


 


Ur Barbiturates Screen     (NEGATIVE)  


 


Ur Phencyclidine Scrn     (NEGATIVE)  


 


Ur Amphetamine Screen     (NEGATIVE)  


 


U Methamphetamines Scrn     (NEGATIVE)  


 


U Benzodiazepines Scrn     (NEGATIVE)  


 


U Cocaine Metab Screen     (NEGATIVE)  


 


U Marijuana (THC) Screen     (NEGATIVE)  


 


Ethyl Alcohol   < 3.0   mg/dL


 


SARS CoV-2 RNA Rapid ZACHARY    NEGATIVE  (NEGATIVE)  














  10/23/20 10/23/20 Range/Units





  21:07 21:07 


 


WBC    (4.0-11.0)  K/uL


 


RBC    (4.50-5.90)  M/uL


 


Hgb    (13.0-17.0)  g/dL


 


Hct    (38.0-50.0)  %


 


MCV    (80.0-98.0)  fL


 


MCH    (27.0-32.0)  pg


 


MCHC    (31.0-37.0)  g/dL


 


RDW Std Deviation    (28.0-62.0)  fl


 


RDW Coeff of Spencer    (11.0-15.0)  %


 


Plt Count    (150-400)  K/uL


 


MPV    (7.40-12.00)  fL


 


Neut % (Auto)    (48.0-80.0)  %


 


Lymph % (Auto)    (16.0-40.0)  %


 


Mono % (Auto)    (0.0-15.0)  %


 


Eos % (Auto)    (0.0-7.0)  %


 


Baso % (Auto)    (0.0-1.5)  %


 


Neut # (Auto)    (1.4-5.7)  K/uL


 


Lymph # (Auto)    (0.6-2.4)  K/uL


 


Mono # (Auto)    (0.0-0.8)  K/uL


 


Eos # (Auto)    (0.0-0.7)  K/uL


 


Baso # (Auto)    (0.0-0.1)  K/uL


 


Nucleated RBC %    /100WBC


 


Nucleated RBCs #    K/uL


 


Sodium    (136-148)  mmol/L


 


Potassium    (3.5-5.1)  mmol/L


 


Chloride    ()  mmol/L


 


Carbon Dioxide    (21.0-32.0)  mmol/L


 


BUN    (7.0-18.0)  mg/dL


 


Creatinine    (0.8-1.3)  mg/dL


 


Est Cr Clr Drug Dosing    


 


Estimated GFR (MDRD)    ml/min


 


Glucose    ()  mg/dL


 


Calcium    (8.5-10.1)  mg/dL


 


Magnesium    (1.8-2.4)  mg/dL


 


Total Bilirubin    (0.2-1.0)  mg/dL


 


AST    (15-37)  IU/L


 


ALT    (14-63)  IU/L


 


Alkaline Phosphatase    ()  U/L


 


Total Protein    (6.4-8.2)  g/dL


 


Albumin    (3.4-5.0)  g/dL


 


Globulin    (2.6-4.0)  g/dL


 


Albumin/Globulin Ratio    (0.9-1.6)  


 


TSH 3rd Generation    (0.36-3.74)  uIU/mL


 


Urine Color  YELLOW   


 


Urine Appearance  SLT CLOUDY   


 


Urine pH  5.5   (5.0-8.0)  


 


Ur Specific Gravity  >= 1.030   (1.001-1.035)  


 


Urine Protein  NEGATIVE   (NEGATIVE)  mg/dL


 


Urine Glucose (UA)  NEGATIVE   (NEGATIVE)  mg/dL


 


Urine Ketones  TRACE H   (NEGATIVE)  mg/dL


 


Urine Occult Blood  TRACE-INTACT H   (NEGATIVE)  


 


Urine Nitrite  NEGATIVE   (NEGATIVE)  


 


Urine Bilirubin  NEGATIVE   (NEGATIVE)  


 


Urine Urobilinogen  0.2   (<2.0)  EU/dL


 


Ur Leukocyte Esterase  NEGATIVE   (NEGATIVE)  


 


Urine RBC  0-2   (0-2/HPF)  


 


Urine WBC  0-2   (0-5/HPF)  


 


Ur Epithelial Cells  MODERATE   (NONE-FEW)  


 


Urine Bacteria  RARE   (NEGATIVE)  


 


Salicylates    (0-20)  mg/dL


 


Urine Opiates Screen   NEGATIVE  (NEGATIVE)  


 


Ur Oxycodone Screen   NEGATIVE  (NEGATIVE)  


 


Urine Methadone Screen   NEGATIVE  (NEGATIVE)  


 


Acetaminophen    ug/mL


 


Ur Barbiturates Screen   NEGATIVE  (NEGATIVE)  


 


Ur Phencyclidine Scrn   NEGATIVE  (NEGATIVE)  


 


Ur Amphetamine Screen   NEGATIVE  (NEGATIVE)  


 


U Methamphetamines Scrn   NEGATIVE  (NEGATIVE)  


 


U Benzodiazepines Scrn   NEGATIVE  (NEGATIVE)  


 


U Cocaine Metab Screen   NEGATIVE  (NEGATIVE)  


 


U Marijuana (THC) Screen   NEGATIVE  (NEGATIVE)  


 


Ethyl Alcohol    mg/dL


 


SARS CoV-2 RNA Rapid ZACHARY    (NEGATIVE)  











Meds: 


Medications











Generic Name Dose Route Start Last Admin





  Trade Name Ameena  PRN Reason Stop Dose Admin


 


Propofol  100 mls @ 3 mls/hr  10/23/20 21:30 





  Diprivan 100 Ml  IV  





  TITRATE YARELY  





  Protocol  





  5 MCG/KG/MIN  


 


Midazolam HCl 50 mg/ Sodium  50 mls @ 0.5 mls/hr  10/23/20 22:15 





  Chloride  IV  





  TITRATE YARELY  





  Protocol  





  0.5 MG/HR  














Discontinued Medications














Generic Name Dose Route Start Last Admin





  Trade Name Ameena  PRN Reason Stop Dose Admin


 


Charcoal  50 gm  10/23/20 21:17 





  Ez Diana Pellets  OGTUBE  10/23/20 21:18 





  NOW STA  


 


Charcoal/Sorbitol  Confirm  10/23/20 21:32 





  Actidose With Sorbitol  Administered  10/23/20 21:33 





  Dose  





  50 gm  





  .ROUTE  





  .STK-MED ONE  


 


Etomidate  20 mg  10/23/20 20:49 





  Amidate  IVPUSH  10/23/20 20:50 





  ONETIME ONE  


 


Sodium Chloride  1,000 mls @ 999 mls/hr  10/23/20 20:49 





  Normal Saline  IV  10/23/20 21:49 





  .Bolus ONE  


 


Midazolam HCl  4 mg  10/23/20 20:49 





  Versed 1 Mg/Ml  IVPUSH  10/23/20 20:50 





  ONETIME ONE  


 


Midazolam HCl  Confirm  10/23/20 21:52 





  Versed 1 Mg/Ml  Administered  10/23/20 21:53 





  Dose  





  4 mg  





  .ROUTE  





  .STK-MED ONE  


 


Midazolam HCl  Confirm  10/23/20 22:02 





  Versed 1 Mg/Ml  Administered  10/23/20 22:03 





  Dose  





  4 mg  





  .ROUTE  





  .STK-MED ONE  


 


Morphine Sulfate  4 mg  10/23/20 20:49 





  Morphine  IVPUSH  10/23/20 20:50 





  ONETIME ONE  


 


Morphine Sulfate  Confirm  10/23/20 21:52 





  Morphine  Administered  10/23/20 21:53 





  Dose  





  4 mg  





  .ROUTE  





  .STK-MED ONE  


 


Succinylcholine Chloride  120 mg  10/23/20 20:49 





  Quelicin  IV  10/23/20 20:50 





  ONETIME ONE  














- Re-Assessments/Exams


Free Text/Narrative Re-Assessment/Exam: 





10/23/20 20:54


Patient intubated without difficulty, see procedure note. He will require 

transfer to outside facility for the need of higher level of care not available 

at this facility, and the need for consultant services unavailable at this 

facility. Any emergency conditions have been stabilized to the ability of the ED

 prior to the transfer. [facility] Mcclure is on diversion for critical care. 





Case was discussed with [facility] ICU Frandy Burt, will 

accept transfer if COVID negative. 





10/23/20 21:07


Case discussed with Gifty at poison control, case number #[id number].


Recommends activated charcoal now via OG tube. Recommend serial EKG [id number] 

for 6hr. If QRS increases > 120ms then bolus sodium Bicarb 1-2mEq, and if QRS 

narrows in response to the bicarb bolus, then start bicarb drip. if hypotensive 

on propofol, switch to versed drip or dilaudid drip. 








10/23/20 22:02


I reassessed the patient, patient is hemodynamically stable on Versed drip now. 





Departure





- Departure


Time of Disposition: 21:14


Disposition: DC/Tfer to Other 70


Condition: Critical


Clinical Impression: 


 Overdose








- Discharge Information


*PRESCRIPTION DRUG MONITORING PROGRAM REVIEWED*: Not Applicable


*COPY OF PRESCRIPTION DRUG MONITORING REPORT IN PATIENT PRITESH: Not Applicable


Instructions:  Intentional Drug Overdose


Referrals: 


PCP,None [Primary Care Provider] - 





Critical Care Note





- Critical Care Note


Total Time (mins): 130


Comments: 


 CRITCAL CARE: 





The high probability of sudden, clinically significant deterioration in the 

patient's condition required the highest level of my preparedness to intervene 

urgently. 





The services I provided to this patient were to treat and/or prevent clinically 

significant deterioration. Services included the following: chart data review, 

reviewing nursing notes and/or old charts, documentation time, consultant 

collaboration regarding findings and treatment options, medication orders and 

management, direct patient care, vital sign assessments and ordering, 

interpreting and reviewing diagnostic studies/lab tests.





Aggregate critical care time includes only time during which I was engaged in 

work directly related to the patient's care, as described above, whether at the 

bedside or elsewhere in the Emergency Department.  It did not include time spent

performing other reported procedures or the services of residents, students, 

nurses or physician assistants. Frequent interventions and/or frequent repeat 

evaluations were required as well as counseling and coordination of care 

regarding prognosis, treatments, and discussions with patient, staff and 

consultants. 





Critical Care (excluding other procedures): 130 minutes 








Sepsis Event Note (ED)





- Focused Exam


Vital Signs: 


                                   Vital Signs











  Temp Resp BP Pulse Ox


 


 10/23/20 20:43  97 F  6 L  170/90 H  98














- My Orders


Last 24 Hours: 


My Active Orders





10/23/20 20:49


Gastrointestinal Tube Mgmt [RC] ASDIRECTED 


RT Airway Intubation [RC] ASDIRECTED 


Nasogastric Orogastric Tube Insertion [OM.PC] Stat 





10/23/20 20:50


Gastrointestinal Tube Mgmt [RC] ASDIRECTED 


Urinary Catheter Assessment [RC] ASDIRECTED 





10/23/20 20:51


EKG Documentation Completion [RC] STAT 





10/23/20 21:00


Insert Urinary Catheter [OM.PC] Q24H 





10/23/20 21:21


RASS Sedation Scale [RC] ASDIRECTED 


Desired Level of Sedation (RASS) [AST] Click to Edit 





10/23/20 21:30


propofoL [Diprivan 100 ML] 100 ml IV TITRATE 





10/23/20 22:15


Midazolam [Versed 5 MG/ML] 50 mg   Sodium Chloride 0.9% [Normal Saline] 40 ml IV

TITRATE 














- Assessment/Plan


Last 24 Hours: 


My Active Orders





10/23/20 20:49


Gastrointestinal Tube Mgmt [RC] ASDIRECTED 


RT Airway Intubation [RC] ASDIRECTED 


Nasogastric Orogastric Tube Insertion [OM.PC] Stat 





10/23/20 20:50


Gastrointestinal Tube Mgmt [RC] ASDIRECTED 


Urinary Catheter Assessment [RC] ASDIRECTED 





10/23/20 20:51


EKG Documentation Completion [RC] STAT 





10/23/20 21:00


Insert Urinary Catheter [OM.PC] Q24H 





10/23/20 21:21


RASS Sedation Scale [RC] ASDIRECTED 


Desired Level of Sedation (RASS) [AST] Click to Edit 





10/23/20 21:30


propofoL [Diprivan 100 ML] 100 ml IV TITRATE 





10/23/20 22:15


Midazolam [Versed 5 MG/ML] 50 mg   Sodium Chloride 0.9% [Normal Saline] 40 ml IV

TITRATE I will SWITCH the dose or number of times a day I take the medications listed below when I get home from the hospital:  None